# Patient Record
Sex: FEMALE | Race: WHITE | Employment: OTHER | URBAN - METROPOLITAN AREA
[De-identification: names, ages, dates, MRNs, and addresses within clinical notes are randomized per-mention and may not be internally consistent; named-entity substitution may affect disease eponyms.]

---

## 2019-11-22 ENCOUNTER — TRANSFERRED RECORDS (OUTPATIENT)
Dept: HEALTH INFORMATION MANAGEMENT | Facility: CLINIC | Age: 79
End: 2019-11-22

## 2020-01-28 ENCOUNTER — PRE VISIT (OUTPATIENT)
Dept: NEUROLOGY | Facility: CLINIC | Age: 80
End: 2020-01-28

## 2020-01-28 NOTE — TELEPHONE ENCOUNTER
FUTURE VISIT INFORMATION      FUTURE VISIT INFORMATION:    Date: 2/3/2020    Time: 10AM    [place] [facility] [place]FORMATION:    Referring provider:  Dr. Baxter     Referring providers [place]  [facility] [place]    Reason for visit/diagnosis  Dementia     RECORDS REQUESTED FROM:       Clinic name Comments Records Status Imaging Status   [facility] [place]2019 Scanned to Media tab  N/A    [facility] 9/4/2019, 7/29/2019 Care Everywhere N/A    [facility] 1/11/2020 Care Everywhere N/A

## 2020-01-31 NOTE — PROGRESS NOTES
[facility] Neurology Consultation    Regina Parra MRN# [id number]   Age: 79 year old YOB: 1940     Requesting physician: Elbert Gonzalez     Reason for Consultation: memory issues    History of Presenting Symptoms:  Regina Parra is a 79 year old female who presents today for evaluation of memory concerns/dementia.    [facility] with her provider Dr. Baxter on 11/1/2019 for memory issues.  She was alone during her visit.  She indicated that she was having issues for an unknown amount of time, and it was difficult to recall anything after 15 minutes.  She was relying on her  more and more, and becoming frustrated.  It is noted that her , while not present at this visit, did come in twice within 12 months to indicate that he thought her memory issues were severe enough for her to be placed in a memory care facility.  A MoCA of 23/30 was obtained but no indications on report as to what cognitive/domains were affected. There was discussion about trial of Namenda with plans to follow up to see the effectiveness.  It was suggested that this was likely classical Alzheimer's disease.    The patient reports forgetting things.  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  She couldn't remember what to do with the multiple ingredients once they were out, the steps to put things together were difficult.  She used to go to the market and buy things but she doesn't do this anymore because it was difficult to drive.  She has stopped driving a year ago due to vision issues, and because she doesn't trust herself in traffic.  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  No issues with walking or falls, no issues with eating but she is eating less, no issues with swallowing, no tremor reported, and her sleep is fine  "(10pm-8am ) and she snores with MITA for which she wears a mandibular appliance. No hallucinations, no atypical motions during sleep, no falls related to syncope mentioned but upon further questioning she reports an event 3 years ago where she \"just dropped\" while standing at the kitchen counter.  Guns are in the house, in a locked case (shotgun).    The patient is here with her , and he indicates that his wife that she can get lost at the [employer] where she worked/volunteered for years.  She can recall old memories well. He notices that as the day goes on, she may not recall early events in the day correctly and she may get agitated easily due to this, especially near bed-time.  He has noticed mood swings occurring occasionally.  There is much less conversation or talking about events of the day recently. He doesn't indicate that she is depressed.        Past Medical History:   Diverticulosis  HTN  HLD  Iron Def anemia  Malignant neoplasm of right breast (2016)  Osteoperosis  MITA  Trigger finger (thumb)     Past Surgical History:   Carpal tunnel surgery (Left 2000, R 2000)  Bunionectomy  Biopsy of breast sentinel node (R)     Social History:    since . 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  Retired late  early  (possibly ).  No drinking history. No smoking history. Have 2 boys.     Family History:   Mother  at age 95  Father  at age 84  Brother-  of prostate cancer     Medications:   Omeprazole  Atorvastatin  Lisinopril  Timolol     Allergies:   None reported     Review of Systems:   A comprehensive 10 point review of systems (constitutional, ENT, cardiac, peripheral vascular, lymphatic, respiratory, GI, , Musculoskeletal, skin, Neurological) was performed and found to be negative except as described in this note.      Physical Exam:   Vitals: BP (!) 168/90   Pulse 64   Resp 16   Wt 71.6 kg (157 lb 12.8 oz)   SpO2 97% "    General: Seated comfortably in no acute distress.  HEENT: Neck supple with normal range of motion. No paracervical muscle tenderness or tightness.   Heart: Regular rate  Lungs: breathing comfortably  GI: Non tender  Extremities: no edema  Skin: No rashes  Neurologic:     Mental Status: Fully alert, attentive and oriented. Speech clear and fluent, no paraphasic errors. MiniCog score of 1/5 (patient draws clock shape and numbers correctly but needs multiple attempts at hand placement, cannot remember all three words even with clues and rhyming.  She can list 35 words that begin with f and only repeats 3. She can recall categories with ease (car types up to 12). She can read and follow short term directions. She can copy cube and overlapping shapes correctly. Luria testing positive. She can repeat short sentences without insertional errors. She has palmomental sign. When recalling past event (9/11/2001) she indicates that it occurred in [place] but in the year 1991 and that Muslims did it, she couldn't recall if any other places were attacked or who did it (she guesses [place]).     Cranial Nerves: Visual fields intact. PERRL. EOMI with normal smooth pursuit. Facial sensation intact/symmetric. Facial movements symmetric. Hearing not formally tested but intact to conversation. Palate elevation symmetric, uvula midline. No dysarthria. Shoulder shrug strong bilaterally. Tongue protrusion midline.     Motor: No tremors or other abnormal movements observed. Muscle tone normal throughout. No pronator drift. Normal/symmetric rapid finger tapping. Slightly weaker on hip flexion and knee extension on the right, otherwise 5/5 in all extremities upper and lower.     Deep Tendon Reflexes: 3+ right patellar, and 1+ Achilles b/l, but otherwise 2+/symmetric throughout upper and lower extremities. No clonus. Toes downgoing bilaterally.     Sensory: Intact/symmetric to light touch, pinprick, temperature, vibration and proprioception  throughout upper. Difficult to differentiate pinprick from light touch in toes, otherwise intact sensations.  Loss of prolonged vibratory sensation on the right (5 seconds knee, 1 second at ankles, 1 second at toes) and left (9 seconds knee, 3 seconds ankle, 3 seconds toes). Negative Romberg.      Coordination: Finger-nose-finger and heel-shin intact without dysmetria. Rapid alternating movements intact/symmetric with normal speed and rhythm.     Gait: Normal, steady casual gait. Able to walk on toes, heels. Tandem with some difficulty, falls left during one footed step. Does  turn with no issue.         Data: Pertinent prior to visit   No Imaging to review         Assessment and Plan:   Assessment:  - Mild or moderate cognitive impairment, amnestic type    The patient has progressive memory decline on history, and in exam today, but minimal other domains seem to be affected with rudimentary testing.  Further neuropsychological evaluation would be helpful to determine extent of deficits.  The patient also needs an MRI to rule out common yet rare causes of progressive memory loss.  Given her story, it is likely that the patient has a dementing process but further evaluation and progression is needed before making a more specific diagnosis.  During out next visit, we will consider starting Namenda or Aricept should testing support a MCI with memory impairment.  There is little in history or exam to suggest parkinson's disease, lewy body dementia, MSA, CBD.     Plan:  - MRI brain  - Neuropsychological referral  - CBC, TSH, B12, MMA, Homocysteine    Follow up in Neurology clinic in 3 months or earlier as needed should new concerns arise.    KERMIT Gresham D.O.   of Neurology      Greater than 50% of the total time (60 min) in this patient encounter was spent on counseling and/or coordination of care. We reviewed diagnostic results, impressions, and discussed other possible tests if  symptoms do not improve. We discussed the implications of the diagnosis, as well as risks and benefits of management options. We reviewed treatment instructions and our scheduled follow-up as specified in the discharge plan. We also discussed the importance of compliance with the chosen course of treatment. The patient is in agreement with this plan and has no further questions.

## 2020-02-03 ENCOUNTER — OFFICE VISIT (OUTPATIENT)
Dept: NEUROLOGY | Facility: CLINIC | Age: 80
End: 2020-02-03
Payer: MEDICARE

## 2020-02-03 VITALS
SYSTOLIC BLOOD PRESSURE: 168 MMHG | DIASTOLIC BLOOD PRESSURE: 90 MMHG | HEART RATE: 64 BPM | RESPIRATION RATE: 16 BRPM | OXYGEN SATURATION: 97 % | WEIGHT: 157.8 LBS

## 2020-02-03 DIAGNOSIS — G31.84 AMNESTIC MCI (MILD COGNITIVE IMPAIRMENT WITH MEMORY LOSS): Primary | ICD-10-CM

## 2020-02-03 DIAGNOSIS — G31.84 AMNESTIC MCI (MILD COGNITIVE IMPAIRMENT WITH MEMORY LOSS): ICD-10-CM

## 2020-02-03 LAB
BASOPHILS # BLD AUTO: 0.1 10E9/L (ref 0–0.2)
BASOPHILS NFR BLD AUTO: 1 %
DIFFERENTIAL METHOD BLD: NORMAL
EOSINOPHIL # BLD AUTO: 0.1 10E9/L (ref 0–0.7)
EOSINOPHIL NFR BLD AUTO: 1.8 %
ERYTHROCYTE [DISTWIDTH] IN BLOOD BY AUTOMATED COUNT: 14.6 % (ref 10–15)
HCT VFR BLD AUTO: 42.8 % (ref 35–47)
HGB BLD-MCNC: 13.5 G/DL (ref 11.7–15.7)
IMM GRANULOCYTES # BLD: 0 10E9/L (ref 0–0.4)
IMM GRANULOCYTES NFR BLD: 0.4 %
LYMPHOCYTES # BLD AUTO: 1.9 10E9/L (ref 0.8–5.3)
LYMPHOCYTES NFR BLD AUTO: 24.1 %
MCH RBC QN AUTO: 27.8 PG (ref 26.5–33)
MCHC RBC AUTO-ENTMCNC: 31.5 G/DL (ref 31.5–36.5)
MCV RBC AUTO: 88 FL (ref 78–100)
MONOCYTES # BLD AUTO: 0.7 10E9/L (ref 0–1.3)
MONOCYTES NFR BLD AUTO: 8.4 %
NEUTROPHILS # BLD AUTO: 5.1 10E9/L (ref 1.6–8.3)
NEUTROPHILS NFR BLD AUTO: 64.3 %
NRBC # BLD AUTO: 0 10*3/UL
NRBC BLD AUTO-RTO: 0 /100
PLATELET # BLD AUTO: 245 10E9/L (ref 150–450)
RBC # BLD AUTO: 4.86 10E12/L (ref 3.8–5.2)
TSH SERPL DL<=0.005 MIU/L-ACNC: 3.38 MU/L (ref 0.4–4)
VIT B12 SERPL-MCNC: 608 PG/ML (ref 193–986)
WBC # BLD AUTO: 7.9 10E9/L (ref 4–11)

## 2020-02-03 PROCEDURE — 83921 ORGANIC ACID SINGLE QUANT: CPT | Performed by: PSYCHIATRY & NEUROLOGY

## 2020-02-03 PROCEDURE — 83090 ASSAY OF HOMOCYSTEINE: CPT | Performed by: PSYCHIATRY & NEUROLOGY

## 2020-02-03 RX ORDER — TIMOLOL MALEATE 5 MG/ML
1 SOLUTION/ DROPS OPHTHALMIC
COMMUNITY
Start: 2018-05-25

## 2020-02-03 RX ORDER — ATORVASTATIN CALCIUM 20 MG/1
TABLET, FILM COATED ORAL
COMMUNITY
Start: 2019-10-28

## 2020-02-03 RX ORDER — LISINOPRIL 20 MG/1
TABLET ORAL
COMMUNITY
Start: 2017-03-13

## 2020-02-03 RX ORDER — BIMATOPROST 0.1 MG/ML
SOLUTION/ DROPS OPHTHALMIC
COMMUNITY
Start: 2019-09-05

## 2020-02-03 RX ORDER — CYANOCOBALAMIN (VITAMIN B-12) 1000 MCG
TABLET ORAL
COMMUNITY
Start: 2020-01-25

## 2020-02-03 RX ORDER — TRAVOPROST 0.04 MG/ML
SOLUTION/ DROPS OPHTHALMIC
COMMUNITY
Start: 2019-08-09

## 2020-02-03 ASSESSMENT — PAIN SCALES - GENERAL: PAINLEVEL: NO PAIN (0)

## 2020-02-03 NOTE — LETTER
2/3/2020       RE: Regina Parra  [address] [address]     Dear Colleague,    Thank you for referring your patient, Regina Parra, to the [facility] NEUROLOGY at [facility]. Please see a copy of my visit note below.    [facility] Neurology Consultation    Regina Parra MRN# [id number]   Age: 79 year old YOB: 1940     Requesting physician: Elbert Gonzalez     Reason for Consultation: memory issues    History of Presenting Symptoms:  Regina Parra is a 79 year old female who presents today for evaluation of memory concerns/dementia.    [place] [facility] [place]. Baxter on 11/1/2019 for memory issues.  She was alone during her visit.  She indicated that she was having issues for an unknown amount of time, and it was difficult to recall anything after 15 minutes.  She was relying on her  more and more, and becoming frustrated.  It is noted that her , while not present at this visit, did come in twice within 12 months to indicate that he thought her memory issues were severe enough for her to be placed in a memory care facility.  A MoCA of 23/30 was obtained but no indications on report as to what cognitive/domains were affected. There was discussion about trial of Namenda with plans to follow up to see the effectiveness.  It was suggested that this was likely classical Alzheimer's disease.    The patient reports forgetting things.  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  She couldn't remember what to do with the multiple ingredients once they were out, the steps to put things together were difficult.  She used to go to the market and buy things but she doesn't do this anymore because it was difficult to drive.  She has stopped driving a year ago due to vision issues, and because she doesn't trust herself in  "traffic.  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  No issues with walking or falls, no issues with eating but she is eating less, no issues with swallowing, no tremor reported, and her sleep is fine (10pm-8am ) and she snores with MITA for which she wears a mandibular appliance. No hallucinations, no atypical motions during sleep, no falls related to syncope mentioned but upon further questioning she reports an event 3 years ago where she \"just dropped\" while standing at the kitchen counter.  Guns are in the house, in a locked case (shotgun).    The patient is here with her , and he indicates that his wife that she can get lost at the [employer] where she worked/volunteered for years.  She can recall old memories well. He notices that as the day goes on, she may not recall early events in the day correctly and she may get agitated easily due to this, especially near bed-time.  He has noticed mood swings occurring occasionally.  There is much less conversation or talking about events of the day recently. He doesn't indicate that she is depressed.        Past Medical History:   Diverticulosis  HTN  HLD  Iron Def anemia  Malignant neoplasm of right breast (2016)  Osteoperosis  MITA  Trigger finger (thumb)     Past Surgical History:   Carpal tunnel surgery (Left 2000, R 2000)  Bunionectomy  Biopsy of breast sentinel node (R)     Social History:    since . 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  No drinking history. No smoking history. Have 2 boys.     Family History:   Mother  at age 95  Father  at age 84  Brother-  of prostate cancer     Medications:   Omeprazole  Atorvastatin  Lisinopril  Timolol     Allergies:   None reported     Review of Systems:   A comprehensive 10 point review of systems (constitutional, ENT, " cardiac, peripheral vascular, lymphatic, respiratory, GI, , Musculoskeletal, skin, Neurological) was performed and found to be negative except as described in this note.      Physical Exam:   Vitals: BP (!) 168/90   Pulse 64   Resp 16   Wt 71.6 kg (157 lb 12.8 oz)   SpO2 97%    General: Seated comfortably in no acute distress.  HEENT: Neck supple with normal range of motion. No paracervical muscle tenderness or tightness.   Heart: Regular rate  Lungs: breathing comfortably  GI: Non tender  Extremities: no edema  Skin: No rashes  Neurologic:     Mental Status: Fully alert, attentive and oriented. Speech clear and fluent, no paraphasic errors. MiniCog score of 1/5 (patient draws clock shape and numbers correctly but needs multiple attempts at hand placement, cannot remember all three words even with clues and rhyming.  She can list 35 words that begin with f and only repeats 3. She can recall categories with ease (car types up to 12). She can read and follow short term directions. She can copy cube and overlapping shapes correctly. Luria testing positive. She can repeat short sentences without insertional errors. She has palmomental sign. When recalling past event (9/11/2001) she indicates that it occurred in [place] but in the year 1991 and that Muslims did it, she couldn't recall if any other places were attacked or who did it (she guesses [place]).     Cranial Nerves: Visual fields intact. PERRL. EOMI with normal smooth pursuit. Facial sensation intact/symmetric. Facial movements symmetric. Hearing not formally tested but intact to conversation. Palate elevation symmetric, uvula midline. No dysarthria. Shoulder shrug strong bilaterally. Tongue protrusion midline.     Motor: No tremors or other abnormal movements observed. Muscle tone normal throughout. No pronator drift. Normal/symmetric rapid finger tapping. Slightly weaker on hip flexion and knee extension on the right, otherwise 5/5 in all extremities upper  and lower.     Deep Tendon Reflexes: 3+ right patellar, and 1+ Achilles b/l, but otherwise 2+/symmetric throughout upper and lower extremities. No clonus. Toes downgoing bilaterally.     Sensory: Intact/symmetric to light touch, pinprick, temperature, vibration and proprioception throughout upper. Difficult to differentiate pinprick from light touch in toes, otherwise intact sensations.  Loss of prolonged vibratory sensation on the right (5 seconds knee, 1 second at ankles, 1 second at toes) and left (9 seconds knee, 3 seconds ankle, 3 seconds toes). Negative Romberg.      Coordination: Finger-nose-finger and heel-shin intact without dysmetria. Rapid alternating movements intact/symmetric with normal speed and rhythm.     Gait: Normal, steady casual gait. Able to walk on toes, heels. Tandem with some difficulty, falls left during one footed step. Does  turn with no issue.         Data: Pertinent prior to visit   No Imaging to review         Assessment and Plan:   Assessment:  - Mild or moderate cognitive impairment, amnestic type    The patient has progressive memory decline on history, and in exam today, but minimal other domains seem to be affected with rudimentary testing.  Further neuropsychological evaluation would be helpful to determine extent of deficits.  The patient also needs an MRI to rule out common yet rare causes of progressive memory loss.  Given her story, it is likely that the patient has a dementing process but further evaluation and progression is needed before making a more specific diagnosis.  During out next visit, we will consider starting Namenda or Aricept should testing support a MCI with memory impairment.  There is little in history or exam to suggest parkinson's disease, lewy body dementia, MSA, CBD.     Plan:  - MRI brain  - Neuropsychological referral  - CBC, TSH, B12, MMA, Homocysteine    Follow up in Neurology clinic in 3 months or earlier as needed should new concerns  mayelin Gresham D.O.   of Neurology    Greater than 50% of the total time (60 min) in this patient encounter was spent on counseling and/or coordination of care. We reviewed diagnostic results, impressions, and discussed other possible tests if symptoms do not improve. We discussed the implications of the diagnosis, as well as risks and benefits of management options. We reviewed treatment instructions and our scheduled follow-up as specified in the discharge plan. We also discussed the importance of compliance with the chosen course of treatment. The patient is in agreement with this plan and has no further questions.

## 2020-02-03 NOTE — NURSING NOTE
Chief Complaint   Patient presents with     Dementia     [facility] NEW - DEMENTIA        Mushtaq Brandon

## 2020-02-03 NOTE — PATIENT INSTRUCTIONS
You have deficits in memory, but we are trying to figure out why.  It is likely due to a mild cognitive impairment.  This disorder needs to be looked into further with neuropsychological testing and an MRI as well as laboratory tests.    We want to get these tests before meeting again in 3 months   - MRI brain  - Neuropsychological referral  - CBC, TSH, B12, MMA, Homocysteine

## 2020-02-05 LAB — HCYS SERPL-SCNC: 10.2 UMOL/L (ref 4–12)

## 2020-02-06 LAB — METHYLMALONATE SERPL-SCNC: 0.18 UMOL/L (ref 0–0.4)

## 2020-02-12 ENCOUNTER — HOSPITAL ENCOUNTER (OUTPATIENT)
Dept: MRI IMAGING | Facility: CLINIC | Age: 80
Discharge: HOME | End: 2020-02-12
Attending: PSYCHIATRY & NEUROLOGY | Admitting: PSYCHIATRY & NEUROLOGY
Payer: MEDICARE

## 2020-02-12 DIAGNOSIS — G31.84 AMNESTIC MCI (MILD COGNITIVE IMPAIRMENT WITH MEMORY LOSS): ICD-10-CM

## 2020-02-12 PROCEDURE — 70551 MRI BRAIN STEM W/O DYE: CPT

## 2020-03-11 ENCOUNTER — HEALTH MAINTENANCE LETTER (OUTPATIENT)
Age: 80
End: 2020-03-11

## 2020-04-14 ENCOUNTER — TELEPHONE (OUTPATIENT)
Dept: NEUROPSYCHOLOGY | Facility: CLINIC | Age: 80
End: 2020-04-14

## 2020-04-14 NOTE — TELEPHONE ENCOUNTER
Pt's  left voicemail for clinic staff regarding his wife's appointment with us on 5/1. Appointment was changed to telephone visit per patient request.

## 2020-04-15 ENCOUNTER — TELEPHONE (OUTPATIENT)
Dept: NEUROLOGY | Facility: CLINIC | Age: 80
End: 2020-04-15

## 2020-04-15 NOTE — TELEPHONE ENCOUNTER
[facility] Call Center    Phone Message    May a detailed message be left on voicemail: yes     Reason for Call: Other: Per call from Uziel wanted to know if the APPT on 5/4 with Dr Gresham can covert to a video visit. Uziel can be reached at the above #.      Action Taken: Message routed to:  [facility] ([facility] Neurology    Travel Screening: Not Applicable

## 2020-04-15 NOTE — TELEPHONE ENCOUNTER
I called and spoke to Uziel.  Regina is having neuropsych testing on 5/1 and was scheduled to see Dr Gresham on 5/4.  He is wondering if the results would be ready by 5/4.  I explained that it can take up to two weeks before the results are read and that it would be best if we cancel 5/4 and reschedule after the results have been read.  He verbalized understanding and agreement.  He will call to reschedule toward the end of May.

## 2020-05-01 ENCOUNTER — VIRTUAL VISIT (OUTPATIENT)
Dept: NEUROPSYCHOLOGY | Facility: CLINIC | Age: 80
End: 2020-05-01
Payer: MEDICARE

## 2020-05-01 DIAGNOSIS — R41.3 MEMORY LOSS: Primary | ICD-10-CM

## 2020-05-01 NOTE — NURSING NOTE
Pt was seen for neuropsychological evaluation at the request of Dr. Gresham for the purposes of diagnostic clarification and treatment planning. 160 minutes of telephone test administration and scoring were provided by this writer. Please see Dr. Anayeli Roach's report for a full interpretation of the findings.    Jasmeet Erwin  Psychometrist

## 2020-05-01 NOTE — LETTER
"5/1/2020       RE: Regina Parra  [address] [facility] [address]     Dear Colleague,    Thank you for referring your patient, Regina Parra, to the [facility] NEUROPSYCHOLOGY at [facility]. Please see a copy of my visit note below.    Regina Parra is a 79 year old female who is being evaluated via a billable telephone visit, after an attempt to connect via the [employer] platform was unsuccessful.      The patient has been notified of following:     \"This telephone visit will be conducted via a call between you and your provider. We have found that certain health care needs can be provided without the need for a physical exam.  This service lets us provide the care you need with a phone conversation and remote testing. You may be asked to come to the clinic to complete the evaluation in person at a later date.    Telephone visits are billed at different rates depending on your insurance coverage. During this emergency period, for some insurers they may be billed the same as an in-person visit.  Please reach out to your insurance provider with any questions.    If during the course of the call the physician/provider feels a telephone visit is not appropriate, you will not be charged for this service.\"    Patient has given verbal consent for Telephone visit?  Yes    What phone number would you like to be contacted at? 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 Information was obtained via telephone interview with the patient and her , and review of her medical records. Ms. Parra has been seen in Neurology with concerns regarding memory and possible dementia. A Neurology consultation from 2/3/2020 notes a MiniCog score of 1/5. She reported more trouble with " "memory, and has stopped cooking and baking because she could not remember what to do with multiple ingredients. She stopped driving a year earlier because of vision issues, because she did not trust herself in traffic, and because she was getting lost on familiar routes. Her  agreed that she can get lost at the center where she volunteered for years. She forgets events that happened earlier in the day, and she gets agitated easily due to this, especially near bedtime. It is also noted that she had been seen in November 2019 at [facility] [place] [facility] by her primary care provider. At that visit, it was noted that her  had come in twice within 12 months to indicate that he thought her memory issues were severe enough for her to be placed in a memory care facility. A MoCA of 23/30 was reported in November. She was referred for neuropsychological evaluation by KERMIT Gresham D.O., for further characterization of any cognitive difficulties and to evaluate mood.    The clinic was closed at the time of her evaluation due to concerns regarding COVID-19. She was scheduled for an evaluation using the [employer] platform, and an attempt was made to meet with her over video using her iPhone. We were unable to establish the connection, so a more limited telephone evaluation was administered. Some additional testing will be required face-to-face, so this will be scheduled at a later date.    CLINICAL INTERVIEW FINDINGS    Upon interview, Ms. Parra stated that she understood that she was referred for this evaluation to \"get a definitive diagnosis of Alzheimer's disease.\"  She stated that she has noticed problems with her memory but also noted that she is 80.  She has not driven in a couple of years because she got lost in a familiar place.  She forgets what has been said the next day or several hours later.  She is misplacing items more than she used to.  Her  noted that she is repeating questions in a conversation and " that there is a loss of ability to deal with things that are complex or unusual.  She is noticing difficulty with word finding but does not feel that this is a change for her.  Her attention and concentration have been fair.  She stated that she does not go on the computer and she has forgotten how to use the desktop.  She has an iPhone but is losing her way with that as well.  She continues to read frequently and she enjoys it.  When asked about decision making, she stated that there are not many decisions for her to make recently.  Her  goes to the grocery store and she gives him a list.  She does not always remember what is in the freezer.    Ms. Parra lives with her  who has always managed their finances.  She manages her own medications.  As noted, she stopped driving a couple of years ago due to becoming lost.  She cooks, but stated that she cannot work through her recipe anymore.  She always used to cook from scratch but now the ingredients are listed in front of her and she cannot keep track of things.  She handles her personal cares independently.    Ms. Parra denied any history of psychiatric illness.  She has not worked with a psychologist.  She described her mood as fairly good.  She stated that she totally enjoys that they still live in their home.  She stated that they live a wonderful neighborhood with a lot of young children and she enjoys watching the children play.  They also have a pond and she likes to watch the baby ducks and goslings.  During the stay at home order her mood has not been much different because she does not drive, and they rarely went out to eat. Later, however, she stated that she is a little blue because they cannot go out to breakfast; although they did not do it often before, it is hard not being able to make the choice.  They used to go to the  for swimming classes until the stay-at-home order began, once or twice a week.  She walks at least three quarters  of a mile a day.  She does not feel depressed. She stated that she is not much of a worrier.  She has been experiencing some anxiety but she cannot pin it down other than that they now have to be at home and that the choice is not hers.  She sleeps well, 6 to 8 hours, and she feels rested in the morning.  She has not been napping during the day.  Her appetite is lower than it used to be and she has lost some weight.  She estimates having lost about 5 pounds, as her clothes are looser.  She is not eating as much, and she is not doing as much so she has less appetite.  She stated that she may be tired of her own cooking.  Her interest level is good.  She looks forward to her books and her walks, and speaking to her sister and some friends on the phone, as well as interacting with neighbors.  She has not done any entertaining for some time, even since before the stay at home order.  She stated that even with her family, including her two sons who live in the area, she cannot get herself motivated to think about planning dinner for 10-12 people.  She denied suicidal ideation or any history of attempts to commit suicide.  She denied visual or auditory hallucinations.    Usually, Ms. Parra consumes 1 alcoholic beverage a day, usually a glass of wine or mikel, or sometimes an old-fashioned.  She may have more than 1 glass of beer if she goes out with people.  She stated that she used to be a  and they would always stop after skiing for a beer and snacks, but even then the most she would have would be 2 beers.  She denied illicit drug use or tobacco use.    Ms. Parra graduated from high school and completed a two-year technical radiology degree.  She took some night classes at the [employer].  She worked as a radiologic technician and a .  She stated that she retired in the 1980s, although the neurologist visit note indicates that she retired in the late 1990's or as late as 2006.  She  "has been  since  and has 2 sons and a stepdaughter.    Ms. Parra denied any history of seizure, stroke, or head injury resulting in loss of consciousness.  Her  noted that one morning about 10 years ago he heard a \"clunk\" in the kitchen.  He found that she had fallen flat on the floor and was unconscious.  He called 911 and she was brought by ambulance to [facility].  She thinks that she was dehydrated.  Tests were apparently normal.  Her balance has been good.  She has not been doing as many balance exercises because [facility].  She is still walking and has not been falling.  She denied unilateral weakness or numbness.  She has not been bothered by tremor.  She has not had headaches.  She is occasionally bothered by low back pain, and her right wrist sometimes gives her trouble.  She will sometimes wear a brace if the risk starts to ache.  This happens sometimes when she is trying to go to sleep.    PAST MEDICAL HISTORY: Medical records indicate a history of  diverticulosis, hypertension, hyperlipidemia, iron deficiency anemia, malignant neoplasm of the right breast, osteoporosis, obstructive sleep apnea, carpal tunnel surgery.     CURRENT MEDICATIONS:  Include atorvastatin, calcium-vitamin D, cyanocobalamin, lisinopril, Lumigan, omeprazole, timolol, travatan.    FAMILY MEDICAL HISTORY:  Significant for a sister who lived into her 90s and had dementia, a father who had a stroke and  at the age of 82.    BEHAVIORAL OBSERVATIONS    As noted, the evaluation was undertaken remotely over the telephone, after an unsuccesful attempt to use a video platform. Behavioral observations are limited for this reason.  Her  joined her for the interview.  She was alert and oriented to person, place, and time. She could accurately name the last five presidents of the [place].  Mood was euthymic.  Speech was mildly slowed, with normal articulation, volume, and rate. Spontaneous " conversation was present and appropriate.  Internal performance validity measures fell within normal limits. Results are interpreted with caution given the limited evaluation and deviation from standard protocols, but are believed to be a reasonable estimate of her functioning.    MEASURES ADMINISTERED    The following measures were administered by a trained psychometrist, remotely via telephone, under the direct supervision of a licensed psychologist:    Orientation; Subtests of the Wechsler Adult Intelligence Scale - 4; Story Memory of the Repeatable Battery for the Assessment of Neuropsychological Status (RBANS); Soto Verbal Learning Test - Revised, Form 1; Controlled Oral Word Association Test; Semantic Fluency; Oral Trail Making Test; Test of Sustained Attention and Tracking; BDAE Complex Ideational Material; ILS Health and Safety Questions; Geriatric Depression Scale (GDS).    RESULTS AND INTERPRETATION    Verbal intellectual functioning was estimated to fall in the average to high average range.     Expressive vocabulary was average for her age. Verbal abstract reasoning was high average. Letter fluency was average for her age, and generative naming to category was low average. Comprehension of complex ideational material fell within normal limits.    Attention span was above average for her age. Divided attention was above average. Performance on a measure of sustained attention and tracking was average.    Judgment and ability to respond appropriately to health and safety questions fell within normal limits.    Immediate recall of verbal narrative material was average, with mildly impaired recall following a brief delay. On a multiple trial list learning task, immediate recall was average, with severely impaired retention (0%) following a 25 minute delay. Recognition memory on this task was average, with some false positive identifications of words.    On the GDS, a self-report questionnaire, Ms. Parra  endorsed a minimal level of depressive symptomatology.     IMPRESSIONS AND RECOMMENDATIONS    Current results of this limited evaluation are interpreted with caution. Due to concerns regarding COVID-19, the evaluation was administered remotely over the telephone, after we were unable to establish a connection using a video platform. Every attempt was made to administer measures in a standardized manner; however, limitations in the battery and differences in administration from the standardization samples may affect interpretation.    Results indicate impairments in memory, although she benefits from cues. Language and attention fall within normal limits, in the average to above average range; it is noted, however, that confrontation naming was not assessed. Executive functioning also fell within normal limits, although examination of executive functioning over the telephone is quite limited. At her neurology clinic visit on 2/3/2020, it was mentioned that Luria testing was positive, and although she could draw the clock shape and numbers correctly, she required multiple attempts at hand placement.Visual processing, motor functioning, and psychomotor processing speed could not be evaluated. She denied experiencing significant depressive symptomatology.    The findings of this evaluation are equivocal, and it will be necessary to complete the testing in person in order to be more confident about the pattern of performance. Specifically, it is unclear whether she truly has an isolated memory disorder, or whether other cognitive domains are affected. Marked improvement in memory with cues suggests a retrieval difficulty, which would be atypical with Alzheimer's disease, and more suggestive of subcortical system involvement. A cerebrovascular etiology remains in the differential. Based on the interview with the patient and her , she has had increasing difficulty managing her instrumental activities of daily  living, including driving, and increasingly, cooking. Her  has previously expressed concern that her memory problems are severe enough that she be placed in a memory care facility. Based on this limited evaluation, there seems to be a discrepancy between her 's report of severe impairments, and her performance on formal testing.    As noted, Ms. Parra will be seen at the [facility] once face-to-face testing is available to complete the neuropsychological evaluation.  At that time, a more detailed evaluation of a broader range of cognitive domains will be administered, and specific additional recommendations regarding treatment planning can be made.  She will be contacted to schedule this appointment as soon as possible.    In the meantime, there is no clear indication that she will require substantial additional assistance with management of her instrumental activities of daily living. She responded quite appropriately to a number of health and safety questions. She may benefit from the use of written reminders or checklists. She could also be encouraged to carry a small notepad on which to record important information, or to use her iPhone for reminders.       Anayeli Roach, Ph.D., ABPP  Licensed Psychologist, LP [id number]  Board Certified in Clinical Neuropsychology    Time spent: 65 minutes professional time (CPT 28464). 45 minutes (1 unit) neuropsychological testing evaluation by licensed and board-certified neuropsychologist, including integration of patient data, interpretation of standardized test results and clinical data, clinical decision-making, treatment planning, report, and interactive feedback to the patient, first hour (CPT 07025). 30 minutes of neuropsychological test administration and scoring by technician, first 30 minutes (CPT 91522). 130 additional minutes (4 units) neuropsychological test administration and scoring by technician, subsequent 30 minutes (CPT 97645). ICD-10 Diagnoses:  R41.3    Due to circumstances that prevent in-person clinical visits, this assessment was conducted using telehealth methods (including remote audio presentation of test instructions and test stimuli). The standard administration of these procedures involves in-person, face-to-face methods. The impact of applying non-standard administration methods has been evaluated only in part by scientific research. While every effort was made to simulate standard assessment practices, the diagnostic conclusions and recommendations for treatment provided in this report are being advanced with these reservations.    Phone call duration (interview): 35 minutes   Phone call duration (testing): 108 minutes  Psychometrist time:  Prep  Rooming & Test Administration    Scoring/Monitoring       Start: 7:45 Start: 9:05 Start: 10:00 Start: 0:00 Start: 12:48 Start: 0:00 Start: 0:00   Stop: 8:00 Stop: 9:10 Stop: 11:48 Stop: 0:00 Stop: 13:20 Stop: 0:00 Stop: 0:00   Total: 15m Total: 5m Total: 108m Total: 0m Total: 32m Total: 0m Total: 0m   Total Administration Time 113m  Total Scoring/Monitor Time 32m  Total Time Overall 160m          Anayeli Roach, PhD LP ABPP            Again, thank you for allowing me to participate in the care of your patient.      Sincerely,    Anayeli Roach, PhD LP

## 2020-05-07 NOTE — PROGRESS NOTES
"Regina Parra is a 79 year old female who is being evaluated via a billable telephone visit, after an attempt to connect via the ViS video platform was unsuccessful.      The patient has been notified of following:     \"This telephone visit will be conducted via a call between you and your provider. We have found that certain health care needs can be provided without the need for a physical exam.  This service lets us provide the care you need with a phone conversation and remote testing. You may be asked to come to the clinic to complete the evaluation in person at a later date.    Telephone visits are billed at different rates depending on your insurance coverage. During this emergency period, for some insurers they may be billed the same as an in-person visit.  Please reach out to your insurance provider with any questions.    If during the course of the call the physician/provider feels a telephone visit is not appropriate, you will not be charged for this service.\"    Patient has given verbal consent for Telephone visit?  Yes    What phone number would you like to be contacted at? 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 Information was obtained via telephone interview with the patient and her , and review of her medical records. Ms. Parra has been seen in Neurology with concerns regarding memory and possible dementia. A Neurology consultation from 2/3/2020 notes a MiniCog score of 1/5. She reported more trouble with memory, and has stopped cooking and baking because she could not remember what to do with multiple ingredients. She stopped driving a year earlier because of vision issues, because she did not trust herself in traffic, and because she was getting lost on familiar routes. Her  agreed " "that she can get lost at the center where she volunteered for years. She forgets events that happened earlier in the day, and she gets agitated easily due to this, especially near bedtime. It is also noted that she had been seen in November 2019 at [facility] by her primary care provider. At that visit, it was noted that her  had come in twice within 12 months to indicate that he thought her memory issues were severe enough for her to be placed in a memory care facility. A MoCA of 23/30 was reported in November. She was referred for neuropsychological evaluation by KERMIT Gresham D.O., for further characterization of any cognitive difficulties and to evaluate mood.    The clinic was closed at the time of her evaluation due to concerns regarding COVID-19. She was scheduled for an evaluation using the [employer] platform, and an attempt was made to meet with her over video using her iPhone. We were unable to establish the connection, so a more limited telephone evaluation was administered. Some additional testing will be required face-to-face, so this will be scheduled at a later date.    CLINICAL INTERVIEW FINDINGS    Upon interview, Ms. Parra stated that she understood that she was referred for this evaluation to \"get a definitive diagnosis of Alzheimer's disease.\"  She stated that she has noticed problems with her memory but also noted that she is 80.  She has not driven in a couple of years because she got lost in a familiar place.  She forgets what has been said the next day or several hours later.  She is misplacing items more than she used to.  Her  noted that she is repeating questions in a conversation and that there is a loss of ability to deal with things that are complex or unusual.  She is noticing difficulty with word finding but does not feel that this is a change for her.  Her attention and concentration have been fair.  She stated that she does not go on the computer and she has " forgotten how to use the desktop.  She has an iPhone but is losing her way with that as well.  She continues to read frequently and she enjoys it.  When asked about decision making, she stated that there are not many decisions for her to make recently.  Her  goes to the grocery store and she gives him a list.  She does not always remember what is in the freezer.    Ms. Parra lives with her  who has always managed their finances.  She manages her own medications.  As noted, she stopped driving a couple of years ago due to becoming lost.  She cooks, but stated that she cannot work through her recipe anymore.  She always used to cook from scratch but now the ingredients are listed in front of her and she cannot keep track of things.  She handles her personal cares independently.    Ms. Parra denied any history of psychiatric illness.  She has not worked with a psychologist.  She described her mood as fairly good.  She stated that she totally enjoys that they still live in their home.  She stated that they live a wonderful neighborhood with a lot of young children and she enjoys watching the children play.  They also have a pond and she likes to watch the baby ducks and goslings.  During the stay at home order her mood has not been much different because she does not drive, and they rarely went out to eat. Later, however, she stated that she is a little blue because they cannot go out to breakfast; although they did not do it often before, it is hard not being able to make the choice.  They used to go to the  for swimming classes until the stay-at-home order began, once or twice a week.  She walks at least three quarters of a mile a day.  She does not feel depressed. She stated that she is not much of a worrier.  She has been experiencing some anxiety but she cannot pin it down other than that they now have to be at home and that the choice is not hers.  She sleeps well, 6 to 8 hours, and she feels  "rested in the morning.  She has not been napping during the day.  Her appetite is lower than it used to be and she has lost some weight.  She estimates having lost about 5 pounds, as her clothes are looser.  She is not eating as much, and she is not doing as much so she has less appetite.  She stated that she may be tired of her own cooking.  Her interest level is good.  She looks forward to her books and her walks, and speaking to her sister and some friends on the phone, as well as interacting with neighbors.  She has not done any entertaining for some time, even since before the stay at home order.  She stated that even with her family, including her two sons who live in the area, she cannot get herself motivated to think about planning dinner for 10-12 people.  She denied suicidal ideation or any history of attempts to commit suicide.  She denied visual or auditory hallucinations.    Usually, Ms. Parra consumes 1 alcoholic beverage a day, usually a glass of wine or mikel, or sometimes an old-fashioned.  She may have more than 1 glass of beer if she goes out with people.  She stated that she used to be a  and they would always stop after skiing for a beer and snacks, but even then the most she would have would be 2 beers.  She denied illicit drug use or tobacco use.    Ms. Parra graduated from high school and completed a two-year technical radiology degree.  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  She worked as a radiologic technician and a .  She stated that she retired in the 1980s, although the neurologist visit note indicates that she retired in the late 1990's or as late as 2006.  She has been  since 1996 and has 2 sons and a stepdaughter.    Ms. Parra denied any history of seizure, stroke, or head injury resulting in loss of consciousness.  Her  noted that one morning about 10 years ago he heard a \"clunk\" in the kitchen.  He found that she had " fallen flat on the floor and was unconscious.  He called 911 and [place] [facility] [place]  She thinks that she was dehydrated.  Tests were apparently normal.  Her balance has been good.  She has not been doing as many balance exercises because [facility].  She is still walking and has not been falling.  She denied unilateral weakness or numbness.  She has not been bothered by tremor.  She has not had headaches.  She is occasionally bothered by low back pain, and her right wrist sometimes gives her trouble.  She will sometimes wear a brace if the risk starts to ache.  This happens sometimes when she is trying to go to sleep.    PAST MEDICAL HISTORY: Medical records indicate a history of  diverticulosis, hypertension, hyperlipidemia, iron deficiency anemia, malignant neoplasm of the right breast, osteoporosis, obstructive sleep apnea, carpal tunnel surgery.     CURRENT MEDICATIONS:  Include atorvastatin, calcium-vitamin D, cyanocobalamin, lisinopril, Lumigan, omeprazole, timolol, travatan.    FAMILY MEDICAL HISTORY:  Significant for a sister who lived into her 90s and had dementia, a father who had a stroke and  at the age of 82.    BEHAVIORAL OBSERVATIONS    As noted, the evaluation was undertaken remotely over the telephone, after an unsuccesful attempt to use a video platform. Behavioral observations are limited for this reason.  Her  joined her for the interview.  She was alert and oriented to person, place, and time. She could accurately name the last five presidents of the [place].  Mood was euthymic.  Speech was mildly slowed, with normal articulation, volume, and rate. Spontaneous conversation was present and appropriate.  Internal performance validity measures fell within normal limits. Results are interpreted with caution given the limited evaluation and deviation from standard protocols, but are believed to be a reasonable estimate of her  functioning.    MEASURES ADMINISTERED    The following measures were administered by a trained psychometrist, remotely via telephone, under the direct supervision of a licensed psychologist:    Orientation; Subtests of the Wechsler Adult Intelligence Scale - 4; Story Memory of the Repeatable Battery for the Assessment of Neuropsychological Status (RBANS); Soto Verbal Learning Test - Revised, Form 1; Controlled Oral Word Association Test; Semantic Fluency; Oral Trail Making Test; Test of Sustained Attention and Tracking; BDAE Complex Ideational Material; ILS Health and Safety Questions; Geriatric Depression Scale (GDS).    RESULTS AND INTERPRETATION    Verbal intellectual functioning was estimated to fall in the average to high average range.     Expressive vocabulary was average for her age. Verbal abstract reasoning was high average. Letter fluency was average for her age, and generative naming to category was low average. Comprehension of complex ideational material fell within normal limits.    Attention span was above average for her age. Divided attention was above average. Performance on a measure of sustained attention and tracking was average.    Judgment and ability to respond appropriately to health and safety questions fell within normal limits.    Immediate recall of verbal narrative material was average, with mildly impaired recall following a brief delay. On a multiple trial list learning task, immediate recall was average, with severely impaired retention (0%) following a 25 minute delay. Recognition memory on this task was average, with some false positive identifications of words.    On the GDS, a self-report questionnaire, Ms. Parra endorsed a minimal level of depressive symptomatology.     IMPRESSIONS AND RECOMMENDATIONS    Current results of this limited evaluation are interpreted with caution. Due to concerns regarding COVID-19, the evaluation was administered remotely over the telephone,  after we were unable to establish a connection using a video platform. Every attempt was made to administer measures in a standardized manner; however, limitations in the battery and differences in administration from the standardization samples may affect interpretation.    Results indicate impairments in memory, although she benefits from cues. Language and attention fall within normal limits, in the average to above average range; it is noted, however, that confrontation naming was not assessed. Executive functioning also fell within normal limits, although examination of executive functioning over the telephone is quite limited. At her neurology clinic visit on 2/3/2020, it was mentioned that Luria testing was positive, and although she could draw the clock shape and numbers correctly, she required multiple attempts at hand placement.Visual processing, motor functioning, and psychomotor processing speed could not be evaluated. She denied experiencing significant depressive symptomatology.    The findings of this evaluation are equivocal, and it will be necessary to complete the testing in person in order to be more confident about the pattern of performance. Specifically, it is unclear whether she truly has an isolated memory disorder, or whether other cognitive domains are affected. Marked improvement in memory with cues suggests a retrieval difficulty, which would be atypical with Alzheimer's disease, and more suggestive of subcortical system involvement. A cerebrovascular etiology remains in the differential. Based on the interview with the patient and her , she has had increasing difficulty managing her instrumental activities of daily living, including driving, and increasingly, cooking. Her  has previously expressed concern that her memory problems are severe enough that she be placed in a memory care facility. Based on this limited evaluation, there seems to be a discrepancy between her  's report of severe impairments, and her performance on formal testing.    As noted, Ms. Parra will be seen at the [facility] [place] once face-to-face testing is available to complete the neuropsychological evaluation.  At that time, a more detailed evaluation of a broader range of cognitive domains will be administered, and specific additional recommendations regarding treatment planning can be made.  She will be contacted to schedule this appointment as soon as possible.    In the meantime, there is no clear indication that she will require substantial additional assistance with management of her instrumental activities of daily living. She responded quite appropriately to a number of health and safety questions. She may benefit from the use of written reminders or checklists. She could also be encouraged to carry a small notepad on which to record important information, or to use her iPhone for reminders.       Anayeli Roach, Ph.D., ABPP  Licensed Psychologist, [id number]  Board Certified in Clinical Neuropsychology    Time spent: 65 minutes professional time (CPT 25938). 45 minutes (1 unit) neuropsychological testing evaluation by licensed and board-certified neuropsychologist, including integration of patient data, interpretation of standardized test results and clinical data, clinical decision-making, treatment planning, report, and interactive feedback to the patient, first hour (CPT 98237). 30 minutes of neuropsychological test administration and scoring by technician, first 30 minutes (CPT 44075). 130 additional minutes (4 units) neuropsychological test administration and scoring by technician, subsequent 30 minutes (CPT 43200). ICD-10 Diagnoses: R41.3    Due to circumstances that prevent in-person clinical visits, this assessment was conducted using telehealth methods (including remote audio presentation of test instructions and test stimuli). The standard administration of these procedures involves in-person,  face-to-face methods. The impact of applying non-standard administration methods has been evaluated only in part by scientific research. While every effort was made to simulate standard assessment practices, the diagnostic conclusions and recommendations for treatment provided in this report are being advanced with these reservations.    Phone call duration (interview): 35 minutes   Phone call duration (testing): 108 minutes  Psychometrist time:  Prep  Rooming & Test Administration    Scoring/Monitoring       Start: 7:45 Start: 9:05 Start: 10:00 Start: 0:00 Start: 12:48 Start: 0:00 Start: 0:00   Stop: 8:00 Stop: 9:10 Stop: 11:48 Stop: 0:00 Stop: 13:20 Stop: 0:00 Stop: 0:00   Total: 15m Total: 5m Total: 108m Total: 0m Total: 32m Total: 0m Total: 0m   Total Administration Time 113m  Total Scoring/Monitor Time 32m  Total Time Overall 160m          Anayeli Roach, PhD LP ABPP

## 2020-05-11 NOTE — PROGRESS NOTES
Patient:  Regina Parra MRN:  [id number] : 1940 79 years old [place]:  20   Education 14 Handedness:   Provider  Psychometrist:  SH   Orientation    WAIS-IV Raw SS RDS   Personal Info 4   Digit Span 31 14 10   Place 2   Vocabulary 40 11    Time -1         Presidents 5   Similarities 27 13      COWAT - FAS/CFL/PRW Semantic Fluency        Raw 36 Raw 26       SS/MAS 11 MAS 7       %ile range 60-71                 Complex Ideational Material          Raw 12         SS 12         T 56         Oral Trails    TSAT      Trails A 8 Z 3.25 Total time 90 Z [id number]   Trails B 25 Z 2.79 Total Errors 2 Z 0.12   HVLT           Trial 1 6   T-Score   T-Score   Trial 2 6 Total Recall 21 47 True Positives 12    Trial 3 9 Delayed Recall 0 <20 False Positives 3    Learning 3 Percent Retention 0 <20 Discrim. Index 9 43   RBANS Story           Total Immediate 17 z Score 0.44 Scaled Score 11     Total Delay 4 z Score -1.21 Scaled Score 6     ILS Health and Safety Questionnaire         Total 38 Classification HIGH       GDS          Total 5 Interpretation MINIMAL

## 2020-05-18 ENCOUNTER — VIRTUAL VISIT (OUTPATIENT)
Dept: NEUROLOGY | Facility: CLINIC | Age: 80
End: 2020-05-18
Payer: MEDICARE

## 2020-05-18 DIAGNOSIS — G31.84 AMNESTIC MCI (MILD COGNITIVE IMPAIRMENT WITH MEMORY LOSS): Primary | ICD-10-CM

## 2020-05-18 RX ORDER — DONEPEZIL HYDROCHLORIDE 5 MG/1
5 TABLET, FILM COATED ORAL AT BEDTIME
Qty: 60 TABLET | Refills: 1 | Status: SHIPPED | OUTPATIENT
Start: 2020-05-18

## 2020-05-18 NOTE — PROGRESS NOTES
"[facility] Neurology Follow Up Visit    Regina Parra MRN# [id number]   Age: 79 year old YOB: 1940     Brief history of symptoms: The patient was initially seen in neurologic consultation on 2/3/2020 for evaluation of memory loss. Please see the comprehensive neurologic consultation note from that date in the Epic records for details. In summary, there were findings suggestive for MCI with memory impairment and further MRI brain and neuropsychological testing was ordered for follow up.  It was thought that the patient may benefit from namenda or aricept upon follow up.     Interval history:  The patient is doing well at home, with no new falls, or neurological symptoms.  She is on the phone alone today, without her  present.  She feels like her  is concerned about having to become a primary care-giver.  She indicates that one of her son's lives in [place], but that no children visit often.       Medications:     Current Outpatient Medications   Medication Sig     atorvastatin (LIPITOR) 20 MG tablet      calcium-vitamin D 500-125 MG-UNIT TABS Take 1 tablet by mouth daily     Cyanocobalamin (B-12) 1000 MCG TABS      lisinopril (PRINIVIL/ZESTRIL) 20 MG tablet      LUMIGAN 0.01 % SOLN ophthalmic solution      omeprazole (PRILOSEC) 20 MG DR capsule Take 20 mg by mouth     timolol maleate (TIMOPTIC) 0.5 % ophthalmic solution 1 drop     TRAVATAN Z 0.004 % ophthalmic solution       Review of Systems:   A comprehensive 10 point review of systems (constitutional, ENT, cardiac, peripheral vascular, lymphatic, respiratory, GI, , Musculoskeletal, skin, Neurological) was performed and found to be negative except as described in this note.     Pertinent Investigations since last visit:   MRI brain: 2/12/2020  1. Moderate leukoaraiosis.  2. Moderate cerebral volume loss.  3. No acute intracranial pathology.    Neuropschological assessment: 5/1/2020: Dr. Anayeli Roach PhD LP  \"The findings of this " "evaluation are equivocal, and it will be necessary to complete the testing in person in order to be more confident about the pattern of performance. Specifically, it is unclear whether she truly has an isolated memory disorder, or whether other cognitive domains are affected. Marked improvement in memory with cues suggests a retrieval difficulty, which would be atypical with Alzheimer's disease, and more suggestive of subcortical system involvement. A cerebrovascular etiology remains in the differential. Based on the interview with the patient and her , she has had increasing difficulty managing her instrumental activities of daily living, including driving, and increasingly, cooking. Her  has previously expressed concern that her memory problems are severe enough that she be placed in a memory care facility. Based on this limited evaluation, there seems to be a discrepancy between her 's report of severe impairments, and her performance on formal testing.    As noted, Ms. Parra will be seen at the [facility] [place] once face-to-face testing is available to complete the neuropsychological evaluation.  At that time, a more detailed evaluation of a broader range of cognitive domains will be administered, and specific additional recommendations regarding treatment planning can be made.  She will be contacted to schedule this appointment as soon as possible.     In the meantime, there is no clear indication that she will require substantial additional assistance with management of her instrumental activities of daily living. She responded quite appropriately to a number of health and safety questions. She may benefit from the use of written reminders or checklists. She could also be encouraged to carry a small notepad on which to record important information, or to use her iPhone for reminders.\"  Anayeli Roach, Ph.D., ABPP         Assessment and Plan:   Assessment:  Memory impairment, multiple etiologies "     Given her MRI not showing drastic amount of atrophy but with a larger than expected degree of subcortical white matter changes, and her neuropsychological evaluation supporting more of a subcortical memory process, the patient doesn't meet the criteria with imaging/testing/clincial history of Alzheimer's disease at this moment.  She would likely fall into the category of mild cognitive disorder amnestic type, but will need further neuropsychological assessment in the near future to better define her cognitive deficits.  For the time being, she may benefit from an acytelcholinesterase inhibitor and was made aware of side effects such as  NMS, cardiac conduction deficits, and certain feelings of dry mouth or foggy mentation.  I am unsure of the patient's home environment and as to how well managed her daily life by her  (care-giver burnout, depression, other changes in mentation).  Since she reports not having a great deal of visitors, I am reliant upon her husbands reports, which according to neuropsychological assessment, is somewhat over exaggerated regarding his wife's deficits.  Meeting with another family member, such as one of their children, will be needed on the next visit to gain further insight as to whether a  consultation is needed.  In the time being, a CPT evaluation through OT would be helpful to determine how much aide is needed at home for daily living an safety.    Plan:  - OT CPT   - F/u in 4-5 months, with other family members present (son)  - Donepezil 5 mg QHS    Follow up in Neurology earlier as needed should new concerns arise.    KERMIT Gresham D.O.   of Neurology      Greater than 50% of the total time (25 min) in this patient encounter was spent on counseling and/or coordination of care. We reviewed diagnostic results, impressions, and discussed other possible tests if symptoms do not improve. We discussed the implications of the diagnosis, as  well as risks and benefits of management options. We reviewed treatment instructions and our scheduled follow-up as specified in the discharge plan. We also discussed the importance of compliance with the chosen course of treatment. The patient is in agreement with this plan and has no further questions.

## 2020-05-18 NOTE — PROGRESS NOTES
"Regina Parra is a 79 year old female who is being evaluated via a billable video visit.      The patient has been notified of following:     \"This video visit will be conducted via a call between you and your physician/provider. We have found that certain health care needs can be provided without the need for an in-person physical exam.  This service lets us provide the care you need with a video conversation.  If a prescription is necessary we can send it directly to your pharmacy.  If lab work is needed we can place an order for that and you can then stop by our lab to have the test done at a later time.    Video visits are billed at different rates depending on your insurance coverage.  Please reach out to your insurance provider with any questions.    If during the course of the call the physician/provider feels a video visit is not appropriate, you will not be charged for this service.\"    Patient has given verbal consent for Video visit? Yes    How would you like to obtain your AVS? [facility]    Patient would like the video invitation sent by: [email or web address]    Will anyone else be joining your video visit? No        Video-Visit Details    Type of service:  Video Visit    Video Start Time: 0923  Video End Time: 9:56 AM    Originating Location (pt. Location): Home    Distant Location (provider location):  [facility] NEUROLOGY     Platform used for Video Visit: Everardo Perez, EMT        "

## 2020-06-10 ENCOUNTER — HOSPITAL ENCOUNTER (OUTPATIENT)
Dept: OCCUPATIONAL THERAPY | Facility: CLINIC | Age: 80
Setting detail: THERAPIES SERIES
End: 2020-06-10
Payer: MEDICARE

## 2020-06-10 DIAGNOSIS — G31.84 AMNESTIC MCI (MILD COGNITIVE IMPAIRMENT WITH MEMORY LOSS): ICD-10-CM

## 2020-06-10 PROCEDURE — 96125 COGNITIVE TEST BY HC PRO: CPT | Mod: GO | Performed by: OCCUPATIONAL THERAPIST

## 2020-06-10 PROCEDURE — 97165 OT EVAL LOW COMPLEX 30 MIN: CPT | Mod: GO | Performed by: OCCUPATIONAL THERAPIST

## 2020-06-10 PROCEDURE — 97535 SELF CARE MNGMENT TRAINING: CPT | Mod: GO | Performed by: OCCUPATIONAL THERAPIST

## 2020-06-10 NOTE — PROGRESS NOTES
[facility]          OUTPATIENT OCCUPATIONAL THERAPY  EVALUATION  PLAN OF TREATMENT FOR OUTPATIENT REHABILITATION  (COMPLETE FOR INITIAL CLAIMS ONLY)  Patient's Last Name, First Name, M.I.  YOB: 1940  Regina Parra                        Provider's Name  [facility] Medical Record No.  [id number]                               Onset Date:     05/18/20   Start of Care Date:     06/10/20   Type:     ___PT   _X_OT   ___SLP Medical Diagnosis:     Amnestic MCI (mild cognitive impairment with memory loss)                          OT Diagnosis:     decreased safety with ADL Visits from SOC:  1   _________________________________________________________________________________  Plan of Treatment/Functional Goals:  ADL training, Cognitive performance testing         Goals  Goal Identifier: 1-CPT  Goal Description: Patient to verbalize understanding of Cognitive Performance Test results and identify 3 strategies to increase patient's safety and independence in the home setting.  Target Date: 06/10/20  Date Met: 06/10/20    Goal Identifier: 2-Memory Compensation  Goal Description: Patient will verbalize understanding of memory compensatory strategies and ways to stay cognitively fit to improve memory and independence for remembering appointments, conversations, etc.   Target Date: 06/10/20  Date Met: 06/10/20            Therapy Frequency: Eval and 1 treatment        Jeanna Chavez OTR/:          I CERTIFY THE NEED FOR THESE SERVICES FURNISHED UNDER        THIS PLAN OF TREATMENT AND WHILE UNDER MY CARE     (Physician co-signature of this document indicates review and certification of the therapy plan).                  Certification date from: 06/10/20, Certification date to: 06/10/20               Referring Physician: Noam Gresham, DO     Initial Assessment        See  Epic Evaluation      Start Of Care Date: 06/10/20

## 2020-06-10 NOTE — PROGRESS NOTES
"   06/10/20 1000   Quick Adds   Quick Adds Certification   Type of Visit Initial Outpatient Occupational Therapy Evaluation   General Information   Start Of Care Date 06/10/20   Referring Physician Noam Gresham, DO   Orders Evaluate and treat as indicated   Orders Date 05/18/20   Medical Diagnosis Amnestic MCI (mild cognitive impairment with memory loss)   Onset of Illness/Injury or Date of Surgery 05/18/20   Precautions/Limitations   (wears hearing aids)   Special Instructions CPT testing   Surgical/Medical History Reviewed Yes   Additional Occupational Profile Info/Pertinent History of Current Problem Patient referred to OP OT for CPT testing. Per chart:  The patient was initially seen in neurologic consultation on 2/3/2020 for evaluation of memory loss. In summary, there were findings suggestive for MCI with memory impairment and further MRI brain and neuropsychological testing was ordered for follow up.  It was thought that the patient may benefit from namenda or aricept upon follow up.  The patient is doing well at home, with no new falls, or neurological symptoms. Per neuropsych test: \"she has had increasing difficulty managing her instrumental activities of daily living, including driving, and increasingly, cooking. Her  has previously expressed concern that her memory problems are severe enough that she be placed in a memory care facility. Based on this limited evaluation, there seems to be a discrepancy between her 's report of severe impairments, and her performance on formal testing.\"   Comments/Observations  present for session.  Reports that he is reading a caregiver guide for dementia but find that they argue a lot at home with different conversations.  Isa reports, \"he is not a caregiver an doesn't want to be a caregiver.\"   Role/Living Environment   Current Community Support Family/friend caregiver   Patient role/Employment history Retired  (radiological tech) "   Community/Avocational Activities Patient enjoys walking, reading, and watching TV programs.  Patient does not play any games/puzzles.  Used to go to the [facility] 2x/week to swim but hasn't been there in a few months due to COVID-19.   Current Living Environment House   Number of Stairs to Enter Home 2   Number of Stairs Within Home 12   Home/Community Accessibility Comments Laundry in the basement as well as deep freezer.     Prior Level - Transfers Independent   Prior Level - Ambulation Independent   Prior Level - ADLS Independent   Prior Responsibilities - IADL Medication management;Laundry;Meal Preparation;Housekeeping   Current Assistive Devices - Mobility   (none)   Pain   Patient currently in pain No   Fall Risk Screen   Have you fallen 2 or more times in the past year? No   Have you fallen and had an injury in the past year? No   Abuse Screen (yes response referral indicated)   Feels Unsafe at Home or Work/School no   Feels Threatened by Someone no   Does Anyone Try to Keep You From Having Contact with Others or Doing Things Outside Your Home? no   Physical Signs of Abuse Present no   Cognitive Status Examination   Orientation Orientation to person, place and time   Level of Consciousness Alert   Follows Commands and Answers Questions 100% of the time;Able to follow single-step instructions   Personal Safety and Judgment At risk behaviors demonstrated  (knew 911)   Memory Impaired   Memory Comments MOCA 24/30 (normal is 26+).  Please refer to neuropsych testing for more information.  Patient able to name all her medications and time of day she takes.     Cognitive Comment Please see Cognitive Performance Test note.   reports it is hard for her to follow directions.     Visual Perception   Visual Perception Wears glasses   Visual Perception Comments No new changes, wears trifocals.     Sensation   Upper Extremity Sensory Examination No deficits were identified   Activity Tolerance   Activity Tolerance  Patient is not napping and sleeping well at night.  reports sun-downers.    Instrumental Activities of Daily Living Assessment   IADL Assessment/Observations Patient is independent with basic ADLs.   completes all driving/shopping, finances, yardwork.  Patient cooks dinner (uses stove/oven), independent with setting up and taking her medications, cleaning and laundry.   Planned Therapy Interventions   Planned Therapy Interventions ADL training;Cognitive performance testing   Adult OT Eval Goals   OT Eval Goals (Adult) 1;2    OT Goal 1   Goal Identifier 1-CPT   Goal Description Patient to verbalize understanding of Cognitive Performance Test results and identify 3 strategies to increase patient's safety and independence in the home setting.   Target Date 06/10/20   Date Met 06/10/20    OT Goal 2   Goal Identifier 2-Memory Compensation   Goal Description Patient will verbalize understanding of memory compensatory strategies and ways to stay cognitively fit to improve memory and independence for remembering appointments, conversations, etc.    Target Date 06/10/20   Date Met 06/10/20   Clinical Impression   Criteria for Skilled Therapeutic Interventions Met Yes, treatment indicated   OT Diagnosis decreased safety with ADL   Influenced by the following impairments cognitive changes   Assessment of Occupational Performance 1-3 Performance Deficits   Identified Performance Deficits ADL/IADLs, medication management, meal prep   Clinical Decision Making (Complexity) Low complexity   Therapy Frequency Eval and 1 treatment session   Risks and Benefits of Treatment have been explained. Yes   Patient, Family & other staff in agreement with plan of care Yes   Clinical Impression Comments Patient was seen for OT eval, completion of the CPT, and education on results/recommendations as well as memory compensation strategies.  No further skilled OT intevention needed and patient discharged from OT.  Please refer to OT  progress note for CPT results/recommendations.   Education Assessment   Barriers To Learning Cognitive   Preferred Learning Style Pictures/video;Demonstration;Reading   Therapy Certification   Certification date from 06/10/20   Certification date to 06/10/20   Total Evaluation Time   OT Eval, Low Complexity Minutes (25140) 15

## 2020-06-10 NOTE — PROGRESS NOTES
Cognitive Performance Test    SUMMARY OF TEST:    The Cognitive Performance Test (CPT) is a standardized performance-based assessment to measure working memory/executive function processing capacities that underlie functional performance. Subtasks include common basic and instrumental activities of daily living (ADL/IADL) which are rated based on the manner in which patients respond to task demands of varying complexity. The total CPT score describes a level of functioning that indicates how information is processed, implications for functional activities, potential safety risks and a recommended level of supervision or assist based on cognitive function. The highest total score on this test is in the range of 5.6 to 5.8.    DATE OF TESTIN.10.2020    RESULTS OF TESTING:                                                                                           CPT Subtest Results    MEDBOX: 4./6 SHOP/GLOVES: 4.5/6 PHONE:    WASH:   TRAVEL:  TOAST:    DRESS:    TOTAL CPT SCORE:  34/39     Average CPT Score  4.8/5.6    INTERPRETATION OF TEST RESULTS:    Based on the Cognitive Performance Test, this patient scored at CPT Level 4.5.  See CPT Levels reference below.    Summary of functional cognitive status:   Patient very pleasant and able to attend to all subtasks today.  Patient demonstrated great listening skills and attention to details with verbal directions.  She set up 3/4 medications correctly (was unsure what as needed was).  She demonstrated good problem solving skills to use the phone, complete cooking task, and find a new location in the building following a map.     Patient's  reporting that she has a hard time following directions and gets lost easily.  This writer observed the patient to have good ability to follow directions today and ability to find a new location that was unfamiliar to her.  When asked for examples from , he said that sometimes she doesn't remember where  they parked the car, which could be because she is not driving and not paying attention at the time. She has never gotten lost walking in her neighborhood and does not drive.    Factors affecting performance:  None.    Recommendations:  Assist for ADL/IADL:  Finances and Driving  Supervision for ADL/IADL:  Meal preparation and Medication management  Supervision in living setting: Live alone with daily checks for safety.  Patient may be left alone during the day.    Recommend education on memory compensation/tips to aid memory which was completed on todays visit.        TIME ADMINISTERING TEST: 45    TIME FOR INTERPRETATION AND PREPARATION OF REPORT: 10    TOTAL TIME: 55      CPT Levels Reference:    Patient's Average CPT Score:  4.8                                                                                                                                                  Individual scores range along a continuum as outlined below.  In addition to cognitive status, other factors may affect safety in a home environment.  Please refer to specific recommendations for this patient.    ___5.6-5.8  Normal functioning (absence of cognitive-functional disability).  Independent in managing personal affairs, monitors and directs own behavior.  Uses complex information to carry out daily activities with safety and accuracy.    Proficient with instrumental activities of daily living (IADL) and learning new activity.  Problems are anticipated, errors are avoided, and consequences of actions are considered.      ___5.0   Mild cognitive-functional disability; deficits in working memory and executive thought processes. Difficulty using complex information. Problems may be observed with recent memory, judgment, reasoning and planning ahead. May be impulsive or have difficulty anticipating consequences.  Safety:  May require assistance to plan ahead; or to manage complex medication schedules, appointments or finances.  Hazardous  "activities may need to be monitored or limited.  ADL:  Mild functional decline.  Able to complete basic self-care and routine household tasks.  May have difficulty with complex daily tasks such as reading, writing, meal preparation, shopping or driving.   Learns through hands on teaching. Self-centered behavior or difficulty considering the needs of others may be seen related to trouble seeing the  whole picture\". Can appear disorganized or uninhibited.    _X__4.5  Mild to moderate cognitive-functional disability. Significant deficits in working memory and executive thought processes. Judgment, reasoning and planning show obvious impairment.  Distractible with inability to shift attention/actions given competing stimuli.  Difficulty with problem solving and managing details. Complex daily tasks performed with inconsistency, difficulty, or error.     Safety:  Medications should be monitored, stove use may require supervision, and driving ability may be affected.  Impaired safety awareness with inability to anticipate potential problems.  May not recognize or respond to emergent situations. Requires frequent check-in support.   ADL:  Mild difficulty with simple everyday self-care tasks. Benefits from structured, routine activity.  Will likely need reminders to complete tasks outside of the routine. Requires assistance with planning and IADL tasks like shopping and finances. Learns concrete tasks through repetition, but performance may not generalize. Tends to be impulsive with poor insight. Self centered behavior or inability to consider the needs of others is common.    ___4.0  Moderate cognitive-functional disability; abstract to concrete thought processes. Working memory and executive function impairments are obvious. Difficulty with planning and problem solving.  Behavior is goal-directed, but unable to follow multi-step directions, is easily distracted, and may not recognize mistakes.  Inability to anticipate " hazards or understand precautions.  Safety:  Recommend 24-hour supervision for safety. Supervision needed for medication management and for hazardous activities. May not be able to follow a restricted diet. Can get lost in unfamiliar surroundings. Generally, persons functioning at level 4 should not be driving.   ADL:  Some decline in quality or frequency of ADL.  [place] enhanced by use of a routine, simple concrete directions, and caregiver set-up of needed items. Complex tasks such as money or home management typically requires assistance.  Relies heavily on vision to guide behavior; will ignore objects/hazards not in plain sight and can be distracted by irrelevant objects. Often has poor insight.  Able to carry out social conversation and may verbally  cover  for deficits leading caregivers to believe they are capable of functioning independently.       ___3.5  Moderate cognitive-functional disability; increased cues needed for task completion. Aware of concrete task steps but needs prompting or cues to initiate and complete simple tasks. Attention span is limited, simple directions may need to be repeated, and re-focus to a topic or task may be required.  Safety:  24-hour supervision required for safety and for assistance with daily tasks. Assistance required with medications, and access to medication should be limited. Meals, nutrition and dietary restrictions need to be monitored.  All hazardous activities should be restricted or supervised. Should not drive. Prone to wandering and can become lost.  ADL:  Moderate functional decline. Familiar tasks usually requires set-up of supplies and directions to complete steps. May need objects handed to them for task initiation. Function best with a set schedule in familiar surroundings with familiar people. All complex tasks must be done by others. Vocabulary is diminished and speech often unfocused.

## 2021-01-04 ENCOUNTER — HEALTH MAINTENANCE LETTER (OUTPATIENT)
Age: 81
End: 2021-01-04

## 2021-04-25 ENCOUNTER — HEALTH MAINTENANCE LETTER (OUTPATIENT)
Age: 81
End: 2021-04-25

## 2021-10-10 ENCOUNTER — HEALTH MAINTENANCE LETTER (OUTPATIENT)
Age: 81
End: 2021-10-10

## 2022-05-22 ENCOUNTER — HEALTH MAINTENANCE LETTER (OUTPATIENT)
Age: 82
End: 2022-05-22

## 2022-09-18 ENCOUNTER — HEALTH MAINTENANCE LETTER (OUTPATIENT)
Age: 82
End: 2022-09-18

## 2023-06-04 ENCOUNTER — HEALTH MAINTENANCE LETTER (OUTPATIENT)
Age: 83
End: 2023-06-04